# Patient Record
(demographics unavailable — no encounter records)

---

## 2024-11-06 NOTE — HISTORY OF PRESENT ILLNESS
[FreeTextEntry1] : She is a 78-year-old woman who is seen today for initial visit for recurrent UTIs via telehealth.  In between urinary tract infections she does not have significant symptoms.  Once every few months she develops frequency, urgency and dysuria.  There is no fever.  She has used over-the-counter Azo.  Also she has responded to Cipro and Bactrim previously.  In 1999 she underwent hysterectomy for benign disease.  Ultrasound in 2019 showed a small left parapelvic renal cyst.  Urine culture in 2021 showed E. coli. Urine culture in July 2024 and urinalysis were normal.  She has symptoms now.  This telephonic visit was provided via audio only technology. Patient was located at home, at the time of the visit. The physician, Cesar Mcclelland MD was located at [place], [place] at the time of the visit. The patient and Provider participated in the telephonic visit. Verbal consent for telephonic services was given on 11/1//2024 by patient. The patient-doctor relationship has been established in a face-to-face fashion via real time video/audio HIPAA compliant communication using telemedicine software. The patient's identity has been confirmed. The patient was previously emailed a copy of the telemedicine consent. They have had a chance to review and has now given verbal consent and has requested care to be assessed and treated via telemedicine. They understand there may be limitations in this process, and that they may need further follow-up care in the office and/or hospital settings.  55.16

## 2024-11-06 NOTE — ASSESSMENT
[FreeTextEntry1] : We discussed the pathophysiology, workup and management of urinary tract infections. The difference between acute, chronic, and recurrent UTIs and also asymptomatic bacteriuria were reviewed. Generally, recurrent UTIs are considered to be 3 infections in 12 months or 2 infections in 6 months. Management of UTIs in pregnancy, patients with anatomic abnormalities and those with indwelling urinary catheters maybe different. Some of the more common symptoms of UTIs are dysuria, urinary frequency and urgency, lower abdominal or pelvic pressure and pain and hematuria. Diagnosis of UTI should occur with a properly collected urine culture when symptomatic. Some of the more common urinary pathogens are Escherichia coli, Enterococcus, Klebsiella, Proteus, etc. Cystoscopy and renal or upper tract imaging are not generally needed but it could be considered. In addition to treatment of symptomatic episodes, patient may consider self initiated treatments when symptomatic. Post-coital prophylactic antibiotic can be considered if UTIs recur after sexual activity. Patients who are symptomatic can consider long-term antibiotic prophylaxis. Risk of developing bacterial resistance has been discussed. Treatment of asymptomatic bacteriuria should be avoided. Non-antibiotic therapy with hydration, cranberry pills, probiotics, and over the counter Azo was reviewed.  Bactrim was refilled.  If she is able, she will resubmit urine culture.